# Patient Record
Sex: MALE | Race: WHITE | Employment: UNEMPLOYED | URBAN - NONMETROPOLITAN AREA
[De-identification: names, ages, dates, MRNs, and addresses within clinical notes are randomized per-mention and may not be internally consistent; named-entity substitution may affect disease eponyms.]

---

## 2019-07-22 ENCOUNTER — HOSPITAL ENCOUNTER (EMERGENCY)
Facility: HOSPITAL | Age: 24
Discharge: HOME | End: 2019-07-22
Attending: NURSE PRACTITIONER | Admitting: NURSE PRACTITIONER
Payer: COMMERCIAL

## 2019-07-22 ENCOUNTER — APPOINTMENT (OUTPATIENT)
Dept: ULTRASOUND IMAGING | Facility: HOSPITAL | Age: 24
End: 2019-07-22
Attending: NURSE PRACTITIONER
Payer: COMMERCIAL

## 2019-07-22 ENCOUNTER — APPOINTMENT (OUTPATIENT)
Dept: GENERAL RADIOLOGY | Facility: HOSPITAL | Age: 24
End: 2019-07-22
Attending: NURSE PRACTITIONER
Payer: COMMERCIAL

## 2019-07-22 VITALS
RESPIRATION RATE: 16 BRPM | HEART RATE: 83 BPM | TEMPERATURE: 97.9 F | SYSTOLIC BLOOD PRESSURE: 127 MMHG | OXYGEN SATURATION: 98 % | WEIGHT: 160 LBS | DIASTOLIC BLOOD PRESSURE: 86 MMHG

## 2019-07-22 DIAGNOSIS — K59.00 CONSTIPATION, UNSPECIFIED CONSTIPATION TYPE: ICD-10-CM

## 2019-07-22 LAB
ALBUMIN SERPL-MCNC: 4.5 G/DL (ref 3.4–5)
ALBUMIN UR-MCNC: NEGATIVE MG/DL
ALP SERPL-CCNC: 85 U/L (ref 40–150)
ALT SERPL W P-5'-P-CCNC: 50 U/L (ref 0–70)
ANION GAP SERPL CALCULATED.3IONS-SCNC: 6 MMOL/L (ref 3–14)
APPEARANCE UR: CLEAR
AST SERPL W P-5'-P-CCNC: 21 U/L (ref 0–45)
BASOPHILS # BLD AUTO: 0.1 10E9/L (ref 0–0.2)
BASOPHILS NFR BLD AUTO: 0.4 %
BILIRUB SERPL-MCNC: 1.4 MG/DL (ref 0.2–1.3)
BILIRUB UR QL STRIP: NEGATIVE
BUN SERPL-MCNC: 10 MG/DL (ref 7–30)
CALCIUM SERPL-MCNC: 9.3 MG/DL (ref 8.5–10.1)
CHLORIDE SERPL-SCNC: 99 MMOL/L (ref 94–109)
CO2 SERPL-SCNC: 29 MMOL/L (ref 20–32)
COLOR UR AUTO: ABNORMAL
CREAT SERPL-MCNC: 0.87 MG/DL (ref 0.66–1.25)
CRP SERPL-MCNC: <2.9 MG/L (ref 0–8)
DIFFERENTIAL METHOD BLD: ABNORMAL
EOSINOPHIL # BLD AUTO: 0.2 10E9/L (ref 0–0.7)
EOSINOPHIL NFR BLD AUTO: 1.3 %
ERYTHROCYTE [DISTWIDTH] IN BLOOD BY AUTOMATED COUNT: 12.6 % (ref 10–15)
GFR SERPL CREATININE-BSD FRML MDRD: >90 ML/MIN/{1.73_M2}
GLUCOSE SERPL-MCNC: 80 MG/DL (ref 70–99)
GLUCOSE UR STRIP-MCNC: NEGATIVE MG/DL
HCT VFR BLD AUTO: 42.6 % (ref 40–53)
HGB BLD-MCNC: 15 G/DL (ref 13.3–17.7)
HGB UR QL STRIP: NEGATIVE
IMM GRANULOCYTES # BLD: 0.1 10E9/L (ref 0–0.4)
IMM GRANULOCYTES NFR BLD: 0.4 %
KETONES UR STRIP-MCNC: 5 MG/DL
LEUKOCYTE ESTERASE UR QL STRIP: NEGATIVE
LYMPHOCYTES # BLD AUTO: 3.6 10E9/L (ref 0.8–5.3)
LYMPHOCYTES NFR BLD AUTO: 31.7 %
MCH RBC QN AUTO: 29.9 PG (ref 26.5–33)
MCHC RBC AUTO-ENTMCNC: 35.2 G/DL (ref 31.5–36.5)
MCV RBC AUTO: 85 FL (ref 78–100)
MONOCYTES # BLD AUTO: 0.9 10E9/L (ref 0–1.3)
MONOCYTES NFR BLD AUTO: 7.6 %
NEUTROPHILS # BLD AUTO: 6.6 10E9/L (ref 1.6–8.3)
NEUTROPHILS NFR BLD AUTO: 58.6 %
NITRATE UR QL: NEGATIVE
NRBC # BLD AUTO: 0 10*3/UL
NRBC BLD AUTO-RTO: 0 /100
PH UR STRIP: 5.5 PH (ref 4.7–8)
PLATELET # BLD AUTO: 221 10E9/L (ref 150–450)
POTASSIUM SERPL-SCNC: 3.7 MMOL/L (ref 3.4–5.3)
PROT SERPL-MCNC: 7.9 G/DL (ref 6.8–8.8)
RBC # BLD AUTO: 5.02 10E12/L (ref 4.4–5.9)
SODIUM SERPL-SCNC: 134 MMOL/L (ref 133–144)
SOURCE: ABNORMAL
SP GR UR STRIP: 1 (ref 1–1.03)
UROBILINOGEN UR STRIP-MCNC: NORMAL MG/DL (ref 0–2)
WBC # BLD AUTO: 11.3 10E9/L (ref 4–11)

## 2019-07-22 PROCEDURE — 36415 COLL VENOUS BLD VENIPUNCTURE: CPT | Performed by: NURSE PRACTITIONER

## 2019-07-22 PROCEDURE — G0463 HOSPITAL OUTPT CLINIC VISIT: HCPCS | Mod: 25

## 2019-07-22 PROCEDURE — 86140 C-REACTIVE PROTEIN: CPT | Performed by: NURSE PRACTITIONER

## 2019-07-22 PROCEDURE — 81003 URINALYSIS AUTO W/O SCOPE: CPT | Performed by: NURSE PRACTITIONER

## 2019-07-22 PROCEDURE — 80053 COMPREHEN METABOLIC PANEL: CPT | Performed by: NURSE PRACTITIONER

## 2019-07-22 PROCEDURE — 99203 OFFICE O/P NEW LOW 30 MIN: CPT | Mod: Z6 | Performed by: NURSE PRACTITIONER

## 2019-07-22 PROCEDURE — 74019 RADEX ABDOMEN 2 VIEWS: CPT | Mod: TC

## 2019-07-22 PROCEDURE — 85025 COMPLETE CBC W/AUTO DIFF WBC: CPT | Performed by: NURSE PRACTITIONER

## 2019-07-22 PROCEDURE — 76705 ECHO EXAM OF ABDOMEN: CPT | Mod: TC

## 2019-07-22 RX ORDER — NICOTINE 21 MG/24HR
1 PATCH, TRANSDERMAL 24 HOURS TRANSDERMAL EVERY 24 HOURS
COMMUNITY

## 2019-07-22 RX ORDER — POLYETHYLENE GLYCOL 3350 17 G/17G
1 POWDER, FOR SOLUTION ORAL DAILY
Qty: 527 G | Refills: 0 | Status: SHIPPED | OUTPATIENT
Start: 2019-07-22 | End: 2019-08-21

## 2019-07-22 ASSESSMENT — ENCOUNTER SYMPTOMS
TROUBLE SWALLOWING: 0
COUGH: 0
CONSTIPATION: 1
PSYCHIATRIC NEGATIVE: 1
FREQUENCY: 0
NECK STIFFNESS: 0
ACTIVITY CHANGE: 0
FEVER: 0
WEAKNESS: 0
CHILLS: 0
NECK PAIN: 0

## 2019-07-22 NOTE — ED PROVIDER NOTES
History     Chief Complaint   Patient presents with     Constipation     Insomnia     The history is provided by the patient. No  was used.     Lv Guzman is a 24 year old male who presents with left flank pain, abdominal fullness, constipation, and insomnia. He's taken Nyquil with mild effectiveness. Denies fever or chills. Posiitve for night sweats. Drinking well. Decreased appetite. Bowel and bladder are working well. No antibiotic use in the past 30 days.       Allergies:  No Known Allergies    Problem List:    There are no active problems to display for this patient.       Past Medical History:    History reviewed. No pertinent past medical history.    Past Surgical History:    History reviewed. No pertinent surgical history.    Family History:    History reviewed. No pertinent family history.    Social History:  Marital Status:  Single [1]  Social History     Tobacco Use     Smoking status: Former Smoker     Last attempt to quit: 2018     Years since quittin.0     Smokeless tobacco: Current User   Substance Use Topics     Alcohol use: Yes     Comment: rarely     Drug use: Not Currently        Medications:      nicotine (NICODERM CQ) 14 MG/24HR 24 hr patch   polyethylene glycol (MIRALAX) powder         Review of Systems   Constitutional: Positive for appetite change. Negative for activity change, chills, fatigue and fever.   HENT: Negative for congestion and trouble swallowing.    Respiratory: Negative for cough.    Cardiovascular: Negative for chest pain.   Gastrointestinal: Positive for constipation. Negative for abdominal pain, nausea and vomiting.        Abdominal fullness. Last BM 2 days ago.    Genitourinary: Positive for flank pain. Negative for discharge, dysuria, frequency, penile pain, penile swelling, scrotal swelling and urgency.        Left flank pain.    Musculoskeletal: Negative for neck pain and neck stiffness.   Skin: Negative for rash.   Neurological:  Negative for weakness and numbness.   Psychiatric/Behavioral: Negative.         Insomnia. Anxiety from insomnia.        Physical Exam   BP: 127/86  Pulse: 83  Temp: 97.9  F (36.6  C)  Resp: 16  Weight: 72.6 kg (160 lb)  SpO2: 98 %      Physical Exam   Constitutional: He is oriented to person, place, and time. He appears well-developed and well-nourished. No distress.   HENT:   Head: Normocephalic.   Mouth/Throat: Oropharynx is clear and moist.   Neck: Normal range of motion. Neck supple.   Cardiovascular: Normal rate, regular rhythm and normal heart sounds.   No murmur heard.  Pulmonary/Chest: Effort normal. No stridor. No respiratory distress. He has no wheezes. He has no rales.   Abdominal: Soft. He exhibits no distension and no mass. There is tenderness. There is no rebound and no guarding.   RUQ tenderness. Negative Salinas's sign.    Genitourinary:   Genitourinary Comments: Negative bilateral CVA tenderness.    Musculoskeletal: Normal range of motion.   Neurological: He is alert and oriented to person, place, and time. He exhibits normal muscle tone.   Skin: Skin is warm and dry. Capillary refill takes less than 2 seconds. He is not diaphoretic. No erythema.   Psychiatric: He has a normal mood and affect. His behavior is normal.   Nursing note and vitals reviewed.      ED Course     Procedures    Results for orders placed or performed during the hospital encounter of 07/22/19   XR Abdomen 2 Views    Narrative    XR ABDOMEN 2 VW   7/22/2019 10:03 AM    History:Male,age  24 years, Constipation. Abdominal fullness.    Comparison: None    FINDINGS: Two views are submitted. Moderate volume of stool is seen  within the colon. There is no evidence of free air or evidence of  obstruction.      Impression    IMPRESSION:  Moderate volume of stool, no acute abnormality.    CHELSIE WATKINS MD   Abdomen US, limited (RUQ only)    Narrative    PROCEDURES: US ABDOMEN LIMITED    HISTORY: RUQ pain., Rule out  cholelithiasis.    TECHNIQUE: Grayscale ultrasound of the upper abdomen was performed.    COMPARISON: none     FINDINGS:    MEASUREMENTS:   Liver length:  60 cm.   Common duct diameter:  0.1 cm.    LIVER: The liver is free of masses or biliary ductal enlargement    GALLBLADDER: No gallstones are seen    ABDOMINAL AORTA AND IVC: The abdominal aorta is normally tapering. The  inferior vena cava is patent.    PANCREAS: Portions of the head and body the pancreas were imaged. The  visualized portions appear normal.    Right KIDNEY: Right kidney is free of masses or hydronephrosis        Impression    IMPRESSION:     Normal ultrasound right upper quadrant    NOEMY BUTTS MD   UA reflex to Microscopic and Culture   Result Value Ref Range    Color Urine Light Yellow     Appearance Urine Clear     Glucose Urine Negative NEG^Negative mg/dL    Bilirubin Urine Negative NEG^Negative    Ketones Urine 5 (A) NEG^Negative mg/dL    Specific Gravity Urine 1.005 1.003 - 1.035    Blood Urine Negative NEG^Negative    pH Urine 5.5 4.7 - 8.0 pH    Protein Albumin Urine Negative NEG^Negative mg/dL    Urobilinogen mg/dL Normal 0.0 - 2.0 mg/dL    Nitrite Urine Negative NEG^Negative    Leukocyte Esterase Urine Negative NEG^Negative    Source Midstream Urine    CBC with platelets differential   Result Value Ref Range    WBC 11.3 (H) 4.0 - 11.0 10e9/L    RBC Count 5.02 4.4 - 5.9 10e12/L    Hemoglobin 15.0 13.3 - 17.7 g/dL    Hematocrit 42.6 40.0 - 53.0 %    MCV 85 78 - 100 fl    MCH 29.9 26.5 - 33.0 pg    MCHC 35.2 31.5 - 36.5 g/dL    RDW 12.6 10.0 - 15.0 %    Platelet Count 221 150 - 450 10e9/L    Diff Method Automated Method     % Neutrophils 58.6 %    % Lymphocytes 31.7 %    % Monocytes 7.6 %    % Eosinophils 1.3 %    % Basophils 0.4 %    % Immature Granulocytes 0.4 %    Nucleated RBCs 0 0 /100    Absolute Neutrophil 6.6 1.6 - 8.3 10e9/L    Absolute Lymphocytes 3.6 0.8 - 5.3 10e9/L    Absolute Monocytes 0.9 0.0 - 1.3 10e9/L    Absolute  Eosinophils 0.2 0.0 - 0.7 10e9/L    Absolute Basophils 0.1 0.0 - 0.2 10e9/L    Abs Immature Granulocytes 0.1 0 - 0.4 10e9/L    Absolute Nucleated RBC 0.0    CRP inflammation   Result Value Ref Range    CRP Inflammation <2.9 0.0 - 8.0 mg/L   Comprehensive metabolic panel   Result Value Ref Range    Sodium 134 133 - 144 mmol/L    Potassium 3.7 3.4 - 5.3 mmol/L    Chloride 99 94 - 109 mmol/L    Carbon Dioxide 29 20 - 32 mmol/L    Anion Gap 6 3 - 14 mmol/L    Glucose 80 70 - 99 mg/dL    Urea Nitrogen 10 7 - 30 mg/dL    Creatinine 0.87 0.66 - 1.25 mg/dL    GFR Estimate >90 >60 mL/min/[1.73_m2]    GFR Estimate If Black >90 >60 mL/min/[1.73_m2]    Calcium 9.3 8.5 - 10.1 mg/dL    Bilirubin Total 1.4 (H) 0.2 - 1.3 mg/dL    Albumin 4.5 3.4 - 5.0 g/dL    Protein Total 7.9 6.8 - 8.8 g/dL    Alkaline Phosphatase 85 40 - 150 U/L    ALT 50 0 - 70 U/L    AST 21 0 - 45 U/L       Assessments & Plan (with Medical Decision Making)     Work up essentially benign. Constipation on XRAY. He feels his insomnia is related to his abdominal fullness. I started him on Miralax daily. He will follow up with any increase in symptoms or concerns.    Discussed plan of care. He verbalized understanding. All questions answered.     I have reviewed the nursing notes.    I have reviewed the findings, diagnosis, plan and need for follow up with the patient.  Discharged in stable condition.        Medication List      Started    polyethylene glycol powder  Commonly known as:  MIRALAX  1 capful, Oral, DAILY            Final diagnoses:   Constipation, unspecified constipation type     Use Miralax daily.   Increase fiber in diet. Handout given.   Increase water intake.   Follow up with PCP with any increase in symptoms or concerns.   Return to urgent care or emergency department with any increase in symptoms or concerns.     Lilly BARR FNP  7/22/2019  9:07 AM  [facility]       Lilly Baer NP  07/23/19 [id number]       Buffy  ANGELIKA Carr  07/23/19 2141       Lilly Baer, ANGELIKA  07/23/19 2144

## 2019-07-22 NOTE — ED NOTES
Pt presents with c/o constipation and some abdominal fullness but denies pain. Pt also c/o insomnia and states he doesn't feel like he has been voiding as often as he should, denies hx of kidney disease, denies dysuria and hesitancy. Pt declines to be seen in the ED and is requesting to be seen in the urgent care. Pt advised that he will have to wait 2 hours before Urgent Care opens and that if he develops any abdominal pain he will have to be seen in the ED. Pt states he still wants to be seen in the Urgent Care and will notify staff if he develops any abdominal pain or if anything changes.

## 2019-07-22 NOTE — DISCHARGE INSTRUCTIONS
Use Miralax daily.   Increase fiber in diet. Handout given.   Increase water intake.   Follow up with PCP with any increase in symptoms or concerns.   Return to urgent care or emergency department with any increase in symptoms or concerns.

## 2019-07-22 NOTE — ED AVS SNAPSHOT
[facility]  [address]  [place] [address]  Phone:  [phone]                                    Lv Guzman   MRN: [id number]    Department:  [facility]   Date of Visit:  7/22/2019           After Visit Summary Signature Page    I have received my discharge instructions, and my questions have been answered. I have discussed any challenges I see with this plan with the nurse or doctor.    ..........................................................................................................................................  Patient/Patient Representative Signature      ..........................................................................................................................................  Patient Representative Print Name and Relationship to Patient    ..................................................               ................................................  Date                                   Time    ..........................................................................................................................................  Reviewed by Signature/Title    ...................................................              ..............................................  Date                                               Time          22EPIC Rev 08/18

## 2019-07-23 ASSESSMENT — ENCOUNTER SYMPTOMS
ABDOMINAL PAIN: 0
NAUSEA: 0
APPETITE CHANGE: 1
FATIGUE: 0
VOMITING: 0
DYSURIA: 0
FLANK PAIN: 1
NUMBNESS: 0

## 2020-08-09 ENCOUNTER — OFFICE VISIT (OUTPATIENT)
Dept: URGENT CARE | Facility: URGENT CARE | Age: 25
End: 2020-08-09
Payer: COMMERCIAL

## 2020-08-09 VITALS
SYSTOLIC BLOOD PRESSURE: 102 MMHG | TEMPERATURE: 97.2 F | WEIGHT: 160 LBS | OXYGEN SATURATION: 100 % | HEART RATE: 93 BPM | DIASTOLIC BLOOD PRESSURE: 63 MMHG

## 2020-08-09 DIAGNOSIS — M54.9 UPPER BACK PAIN ON RIGHT SIDE: Primary | ICD-10-CM

## 2020-08-09 LAB
ALBUMIN UR-MCNC: NEGATIVE MG/DL
APPEARANCE UR: CLEAR
BILIRUB UR QL STRIP: NEGATIVE
COLOR UR AUTO: YELLOW
GLUCOSE UR STRIP-MCNC: NEGATIVE MG/DL
HGB UR QL STRIP: NEGATIVE
KETONES UR STRIP-MCNC: 15 MG/DL
LEUKOCYTE ESTERASE UR QL STRIP: NEGATIVE
NITRATE UR QL: NEGATIVE
PH UR STRIP: 6.5 PH (ref 5–7)
SOURCE: ABNORMAL
SP GR UR STRIP: 1.01 (ref 1–1.03)
UROBILINOGEN UR STRIP-ACNC: 0.2 EU/DL (ref 0.2–1)

## 2020-08-09 PROCEDURE — 99203 OFFICE O/P NEW LOW 30 MIN: CPT | Performed by: FAMILY MEDICINE

## 2020-08-09 PROCEDURE — 81003 URINALYSIS AUTO W/O SCOPE: CPT | Performed by: FAMILY MEDICINE

## 2020-08-09 NOTE — PATIENT INSTRUCTIONS
Patient Education     How Your Back Works  A healthy back lets you bend and stretch without pain. The spine has 3 natural curves. These keep your body balanced. Strong, flexible muscles support your spine. Soft, cushioning disks separate the hard bones of your spine. The disks let your spine bend and move.    The parts of the spine    The vertebrae are the 24 bones that make up the spine.    The spinous process is the part of each vertebra you can feel through your skin.    Each of these bones has a central hole that runs top to bottom. Together these holes form a tunnel called the spinal canal.    The lamina of each vertebra forms the back of the spinal canal.    Running through the canal are nerves. They are attached in a bundle called the spinal cord for most of the canal.    A foramen is a small opening where a spinal nerve root leaves the spinal canal.    Disks serve as cushions between vertebrae. A disk s soft center absorbs shock during movement.     Two vertebrae and a disk     The supporting muscles  Strong, flexible muscles help maintain your 3 natural curves. They hold your spine in correct alignment. This helps support your upper body. Strong core muscles help take the strain off your back. These include the stomach, buttock, and thigh muscles.  Date Last Reviewed: 1/1/2018 2000-2019 [employer]. [address] [place] [address], [place] [address]. All rights reserved. This information is not intended as a substitute for professional medical care. Always follow your healthcare professional's instructions.

## 2020-08-09 NOTE — PROGRESS NOTES
Subjective     Lv Guzman is a 25 year old male who presents to clinic today for the following health issues:    HPI   Patient comes in today with concern of right upper back pain, feeling more achy for the past day.  He denies any injury, denies any trauma.  He reports he has been physically more active, exercising.  He denies other pain, has no nausea, no vomiting, denies abdominal pain.    He does have a plan movement daily, continue making good amount of urine.  Does not drink a lot of water.  Denies blood in his urine or stool.  He has no fever, no chills, no weight loss.  He reports pain is worse when he moves certain ways.  Does not change when he eat, drink, no pain when he take a deep breath or cough.  Denies recent sickness.  There is no problem list on file for this patient.    No past surgical history on file.    Social History     Tobacco Use     Smoking status: Former Smoker     Last attempt to quit: 2018     Years since quittin.0     Smokeless tobacco: Current User   Substance Use Topics     Alcohol use: Yes     Comment: rarely     No family history on file.      Current Outpatient Medications   Medication Sig Dispense Refill     nicotine (NICODERM CQ) 14 MG/24HR 24 hr patch Place 1 patch onto the skin every 24 hours       No Known Allergies    Reviewed and updated as needed this visit by Provider         Review of Systems   Constitutional, HEENT, cardiovascular, pulmonary, gi and gu systems are negative, except as otherwise noted.      Objective    /63   Pulse 93   Temp 97.2  F (36.2  C) (Tympanic)   Wt 72.6 kg (160 lb)   SpO2 100%   There is no height or weight on file to calculate BMI.  Physical Exam   GENERAL APPEARANCE: healthy, alert and no distress  NECK: no adenopathy, no asymmetry, masses, or scars and thyroid normal to palpation  RESP: lungs clear to auscultation - no rales, rhonchi or wheezes  CV: regular rates and rhythm, normal S1 S2, no S3 or S4 and no  murmur, click or rub  ABDOMEN: soft, nontender, without hepatosplenomegaly or masses and bowel sounds normal  ORTHO: Lumber/Thoracic Spine Exam: Tender:  right parathoracic muscles  Non-tender:  Rest of his back exam,  Range of Motion:  left lateral thoracic bending   full, painful, right lateral thoracic bending  full, painful, left thoracic rotation  full, right thoracic rotation  full, painful, lumbar flexion  full, pain-free, lumbar extension  full, pain-free  Strength:  able to heel walk, able to toe walk  Special tests:  negative straight leg raises    Hip Exam: Hip ROM full    SKIN: no suspicious lesions or rashes  NEURO: Normal strength and tone, mentation intact and speech normal  PSYCH: mentation appears normal and affect normal/bright    Diagnostic Test Results:  Labs reviewed in Epic  UA RESULTS:  Recent Labs   Lab Test 08/09/20  0915   COLOR Yellow   APPEARANCE Clear   URINEGLC Negative   URINEBILI Negative   URINEKETONE 15*   SG 1.010   UBLD Negative   URINEPH 6.5   PROTEIN Negative   UROBILINOGEN 0.2   NITRITE Negative   LEUKEST Negative             Assessment & Plan       ICD-10-CM    1. Upper back pain on right side  M54.9      Reviewed his urine results, showed only ketones.  Could be related to dehydration, or physical activity.  Otherwise, patient exam was normal.  Likely related to musculoskeletal in nature.  Was advised with supportive care, home subsides.    Patient Instructions       Patient Education     How Your Back Works  A healthy back lets you bend and stretch without pain. The spine has 3 natural curves. These keep your body balanced. Strong, flexible muscles support your spine. Soft, cushioning disks separate the hard bones of your spine. The disks let your spine bend and move.    The parts of the spine    The vertebrae are the 24 bones that make up the spine.    The spinous process is the part of each vertebra you can feel through your skin.    Each of these bones has a central hole  that runs top to bottom. Together these holes form a tunnel called the spinal canal.    The lamina of each vertebra forms the back of the spinal canal.    Running through the canal are nerves. They are attached in a bundle called the spinal cord for most of the canal.    A foramen is a small opening where a spinal nerve root leaves the spinal canal.    Disks serve as cushions between vertebrae. A disk s soft center absorbs shock during movement.     Two vertebrae and a disk     The supporting muscles  Strong, flexible muscles help maintain your 3 natural curves. They hold your spine in correct alignment. This helps support your upper body. Strong core muscles help take the strain off your back. These include the stomach, buttock, and thigh muscles.  Date Last Reviewed: 1/1/2018 2000-2019 [employer]. [address] [place] [address], [place] [address]. All rights reserved. This information is not intended as a substitute for professional medical care. Always follow your healthcare professional's instructions.               Return in about 1 week (around 8/16/2020) for In-Clinic Visit.    Ld Rodriguez MD  [facility]

## 2020-08-10 ENCOUNTER — OFFICE VISIT (OUTPATIENT)
Dept: URGENT CARE | Facility: URGENT CARE | Age: 25
End: 2020-08-10
Payer: COMMERCIAL

## 2020-08-10 ENCOUNTER — ANCILLARY PROCEDURE (OUTPATIENT)
Dept: GENERAL RADIOLOGY | Facility: CLINIC | Age: 25
End: 2020-08-10
Attending: PHYSICIAN ASSISTANT
Payer: COMMERCIAL

## 2020-08-10 VITALS
HEART RATE: 64 BPM | TEMPERATURE: 98.4 F | DIASTOLIC BLOOD PRESSURE: 76 MMHG | WEIGHT: 162 LBS | RESPIRATION RATE: 16 BRPM | SYSTOLIC BLOOD PRESSURE: 106 MMHG | OXYGEN SATURATION: 100 %

## 2020-08-10 DIAGNOSIS — K59.00 CONSTIPATION, UNSPECIFIED CONSTIPATION TYPE: ICD-10-CM

## 2020-08-10 DIAGNOSIS — R10.11 RUQ ABDOMINAL PAIN: ICD-10-CM

## 2020-08-10 DIAGNOSIS — R10.11 RUQ ABDOMINAL PAIN: Primary | ICD-10-CM

## 2020-08-10 LAB
BASOPHILS # BLD AUTO: 0 10E9/L (ref 0–0.2)
BASOPHILS NFR BLD AUTO: 0.3 %
DIFFERENTIAL METHOD BLD: NORMAL
EOSINOPHIL # BLD AUTO: 0.2 10E9/L (ref 0–0.7)
EOSINOPHIL NFR BLD AUTO: 2 %
ERYTHROCYTE [DISTWIDTH] IN BLOOD BY AUTOMATED COUNT: 12.7 % (ref 10–15)
HCT VFR BLD AUTO: 45.6 % (ref 40–53)
HGB BLD-MCNC: 15.7 G/DL (ref 13.3–17.7)
LYMPHOCYTES # BLD AUTO: 2.9 10E9/L (ref 0.8–5.3)
LYMPHOCYTES NFR BLD AUTO: 26.9 %
MCH RBC QN AUTO: 28.9 PG (ref 26.5–33)
MCHC RBC AUTO-ENTMCNC: 34.4 G/DL (ref 31.5–36.5)
MCV RBC AUTO: 84 FL (ref 78–100)
MONOCYTES # BLD AUTO: 1.1 10E9/L (ref 0–1.3)
MONOCYTES NFR BLD AUTO: 10.1 %
NEUTROPHILS # BLD AUTO: 6.5 10E9/L (ref 1.6–8.3)
NEUTROPHILS NFR BLD AUTO: 60.7 %
PLATELET # BLD AUTO: 220 10E9/L (ref 150–450)
RBC # BLD AUTO: 5.43 10E12/L (ref 4.4–5.9)
WBC # BLD AUTO: 10.7 10E9/L (ref 4–11)

## 2020-08-10 PROCEDURE — 36415 COLL VENOUS BLD VENIPUNCTURE: CPT | Performed by: PHYSICIAN ASSISTANT

## 2020-08-10 PROCEDURE — 85025 COMPLETE CBC W/AUTO DIFF WBC: CPT | Performed by: PHYSICIAN ASSISTANT

## 2020-08-10 PROCEDURE — 99214 OFFICE O/P EST MOD 30 MIN: CPT | Performed by: PHYSICIAN ASSISTANT

## 2020-08-10 PROCEDURE — 74019 RADEX ABDOMEN 2 VIEWS: CPT

## 2020-08-10 ASSESSMENT — ENCOUNTER SYMPTOMS
FATIGUE: 0
COUGH: 0
RHINORRHEA: 0
PALPITATIONS: 0
SORE THROAT: 0
CONSTIPATION: 1
FEVER: 0
NAUSEA: 0
ABDOMINAL PAIN: 1
WHEEZING: 0
ARTHRALGIAS: 0
SHORTNESS OF BREATH: 0
CHILLS: 0
EYE ITCHING: 0
EYE DISCHARGE: 0
UNEXPECTED WEIGHT CHANGE: 0
SINUS PAIN: 0
VOMITING: 0
EYE REDNESS: 0
DIARRHEA: 0
MYALGIAS: 0
EYE PAIN: 0
SINUS PRESSURE: 0

## 2020-08-10 NOTE — PATIENT INSTRUCTIONS
Start miralax once daily  If pain is severe go to the ER  If pain continues follow up with primary care provider

## 2020-08-10 NOTE — PROGRESS NOTES
SUBJECTIVE:   Lv Guzman is a 25 year old male presenting with a chief complaint of   Chief Complaint   Patient presents with     Abdominal Pain     x3 days, right abdominal pain and right back pain, feels like there is alot of pressure, said he is having abnormal bowels, said he is constipated, was seen in urgent care yesterday        He is an established patient of [facility].    Abdominal Pain    Location: RUQ   Radiation: None and back.    Pain character: dull and aching,   Severity: 2 on a scale of 1-10.    Duration: 3 day(s)   Course of Illness: slowly progressive.  Exacerbated by: nothing  Relieved by: nothing.  Associated Symptoms: constipation, nausea, last BM was yesterday was diarrhea, had milk of mag  Female : Not applicable  Surgical History: none        Review of Systems   Constitutional: Negative for chills, fatigue, fever and unexpected weight change.   HENT: Negative for congestion, ear pain, rhinorrhea, sinus pressure, sinus pain and sore throat.    Eyes: Negative for pain, discharge, redness and itching.   Respiratory: Negative for cough, shortness of breath and wheezing.    Cardiovascular: Negative for chest pain, palpitations and leg swelling.   Gastrointestinal: Positive for abdominal pain and constipation. Negative for diarrhea, nausea and vomiting.   Musculoskeletal: Negative for arthralgias and myalgias.   Skin: Negative for rash.       History reviewed. No pertinent past medical history.  History reviewed. No pertinent family history.  Current Outpatient Medications   Medication Sig Dispense Refill     nicotine (NICODERM CQ) 14 MG/24HR 24 hr patch Place 1 patch onto the skin every 24 hours       Social History     Tobacco Use     Smoking status: Former Smoker     Last attempt to quit: 2018     Years since quittin.0     Smokeless tobacco: Current User   Substance Use Topics     Alcohol use: Yes     Comment: rarely       OBJECTIVE  /76   Pulse 64   Temp 98.4  F  (36.9  C) (Tympanic)   Resp 16   Wt 73.5 kg (162 lb)   SpO2 100%     Physical Exam  Constitutional:       General: He is not in acute distress.     Appearance: He is well-developed.   HENT:      Head: Normocephalic and atraumatic.      Right Ear: Tympanic membrane and ear canal normal.      Left Ear: Tympanic membrane and ear canal normal.   Eyes:      Conjunctiva/sclera: Conjunctivae normal.      Pupils: Pupils are equal, round, and reactive to light.   Cardiovascular:      Rate and Rhythm: Normal rate and regular rhythm.   Pulmonary:      Effort: Pulmonary effort is normal.      Breath sounds: Normal breath sounds.   Abdominal:      General: Bowel sounds are normal.      Palpations: Abdomen is soft.      Tenderness: There is abdominal tenderness in the right upper quadrant. There is no guarding or rebound.   Skin:     General: Skin is warm and dry.      Findings: No rash.   Psychiatric:         Behavior: Behavior normal.         Labs:  Results for orders placed or performed in visit on 08/10/20 (from the past 24 hour(s))   CBC with platelets differential   Result Value Ref Range    WBC 10.7 4.0 - 11.0 10e9/L    RBC Count 5.43 4.4 - 5.9 10e12/L    Hemoglobin 15.7 13.3 - 17.7 g/dL    Hematocrit 45.6 40.0 - 53.0 %    MCV 84 78 - 100 fl    MCH 28.9 26.5 - 33.0 pg    MCHC 34.4 31.5 - 36.5 g/dL    RDW 12.7 10.0 - 15.0 %    Platelet Count 220 150 - 450 10e9/L    % Neutrophils 60.7 %    % Lymphocytes 26.9 %    % Monocytes 10.1 %    % Eosinophils 2.0 %    % Basophils 0.3 %    Absolute Neutrophil 6.5 1.6 - 8.3 10e9/L    Absolute Lymphocytes 2.9 0.8 - 5.3 10e9/L    Absolute Monocytes 1.1 0.0 - 1.3 10e9/L    Absolute Eosinophils 0.2 0.0 - 0.7 10e9/L    Absolute Basophils 0.0 0.0 - 0.2 10e9/L    Diff Method Automated Method        X-Ray was done, my findings are: no SBO    ASSESSMENT:      ICD-10-CM    1. RUQ abdominal pain  R10.11 CBC with platelets differential     XR Abdomen 2 Views   2. Constipation, unspecified  constipation type  K59.00         Medical Decision Making:    Differential Diagnosis:  Abdominal Pain: Constipation, GB/Cholelithiasis, GERD/Ulcer, Appendix, IBS, Abdominal Wall and Bowel Obstruction    Serious Comorbid Conditions:  Adult:  None    PLAN:    ABD Pain:  Continue to monitor symptoms. Start miralax for constipation. Discussed in detail symptoms that would warrant emergent evaluation in the ED. Patient agrees with plan and will follow up as needed.      Followup:    If not improving or if condition worsens, follow up with your Primary Care Provider    Patient Instructions   Start miralax once daily  If pain is severe go to the ER  If pain continues follow up with primary care provider

## 2021-02-15 ENCOUNTER — OFFICE VISIT (OUTPATIENT)
Dept: FAMILY MEDICINE | Facility: CLINIC | Age: 26
End: 2021-02-15
Payer: COMMERCIAL

## 2021-02-15 VITALS
HEART RATE: 70 BPM | BODY MASS INDEX: 22.48 KG/M2 | DIASTOLIC BLOOD PRESSURE: 64 MMHG | WEIGHT: 157 LBS | OXYGEN SATURATION: 98 % | RESPIRATION RATE: 16 BRPM | SYSTOLIC BLOOD PRESSURE: 90 MMHG | TEMPERATURE: 98.1 F | HEIGHT: 70 IN

## 2021-02-15 DIAGNOSIS — R42 DIZZINESS: Primary | ICD-10-CM

## 2021-02-15 PROBLEM — Q23.81 BICUSPID AORTIC VALVE: Status: ACTIVE | Noted: 2019-04-11

## 2021-02-15 PROCEDURE — 36415 COLL VENOUS BLD VENIPUNCTURE: CPT | Performed by: NURSE PRACTITIONER

## 2021-02-15 PROCEDURE — 99213 OFFICE O/P EST LOW 20 MIN: CPT | Performed by: NURSE PRACTITIONER

## 2021-02-15 PROCEDURE — 82947 ASSAY GLUCOSE BLOOD QUANT: CPT | Performed by: NURSE PRACTITIONER

## 2021-02-15 RX ORDER — MECLIZINE HYDROCHLORIDE 25 MG/1
25 TABLET ORAL 3 TIMES DAILY PRN
Qty: 20 TABLET | Refills: 0 | Status: SHIPPED | OUTPATIENT
Start: 2021-02-15 | End: 2021-06-08

## 2021-02-15 ASSESSMENT — MIFFLIN-ST. JEOR: SCORE: 1707.37

## 2021-02-15 NOTE — PROGRESS NOTES
"    {PROVIDER CHARTING PREFERENCE:913453}    Subjective   Lv is a 25 year old who presents for the following health issues {ACCOMPANIED BY STATEMENT (Optional):806013}    HPI       Abdominal/Flank Pain  Onset/Duration: ***  Description:   Character: {.:621663}  Location: {.:234714}  Radiation: {.:597620::\"None\"}  Intensity: {.:203999}  Progression of Symptoms:  {.:093991}  Accompanying Signs & Symptoms:  Fever/Chills: {.:334662::\"no\"}  Gas/Bloating: {.:683255::\"no\"}  Nausea: {.:405487::\"no\"}  Vomitting: {.:107886::\"no\"}  Diarrhea: {.:060918::\"no\"}  Constipation: {.:470281::\"no\"}  Dysuria or Hematuria: {.:813779::\"no\"}  History:   Trauma: {.:044019::\"no\"}  Previous similar pain: {.:708555::\"no\"}  Previous tests done: { .:369750}  Precipitating factors:   Does the pain change with:     Food: {.:364977::\"no\"}    Bowel Movement: {.:011408::\"no\"}    Urination: {.:770069::\"no\"}   Other factors:  {.:887611::\"no\"}  Therapies tried and outcome: {NONEORCHOOSE:100544::\"None\"}      {additonal problems for provider to add (Optional):134099}    Review of Systems   {ROS COMP (Optional):666549}      Objective    There were no vitals taken for this visit.  There is no height or weight on file to calculate BMI.  Physical Exam   {Exam List (Optional):196069}    {Diagnostic Test Results (Optional):525961}    {AMBULATORY ATTESTATION (Optional):134959}        "

## 2021-02-15 NOTE — PROGRESS NOTES
"    Assessment & Plan     Dizziness  Benign exam and no focal neuro concerns.  At end of appt he mentions eating a diet high in sugar; will check a glucose today.  Risks, benefits and side effects discussed.  Ensure plenty of fluids.  If symptoms persist over the next 4 wks should be seen for follow-up; sooner if worsening.   - Glucose  - meclizine (ANTIVERT) 25 MG tablet; Take 1 tablet (25 mg) by mouth 3 times daily as needed for dizziness             Return in about 4 weeks (around 3/15/2021) for dizziness .    MOMO Rowan CNP  [facility] REID Awan is a 25 year old who presents for the following health issues     HPI       Dizziness  Onset/Duration: x 1-2 weeks  Description:   Do you feel faint: \"foggy, difficult to focus\"  Does it feel like the surroundings (bed, room) are moving: no  Unsteady/off balance: YES- unsteady  Have you passed out or fallen: no  Intensity: mild, moderate  Progression of Symptoms: worsening  Accompanying Signs & Symptoms:  Heart palpitations or chest pain: no  Nausea, vomiting: YES- nausea  Weakness or lack of coordination in arms or legs: no  Vision or speech changes: no  Numbness or tingling: no  Ringing in ears (Tinnitus): YES  Hearing Loss: no  History:   Head trauma/concussion history: no  Previous similar symptoms: no  Recent bleeding history: no  Any new medications (BP?): no  Precipitating factors:   Worse with activity: no  Worse with head movement: YES  Alleviating factors:   Does staying in a fixed position give relief: no   Therapies tried and outcome: None    Intermittent dizziness that triggers nausea.   Intermittent burning on the R side of the abdomen.    No change in stool pattern or blood in stools.   No vomiting.   No recent illness.  Appetite unchanged, eating well.    Occasional burping and bloating.       Review of Systems   Constitutional, HEENT, cardiovascular, pulmonary, gi and gu, and neuro systems are " "negative, except as otherwise noted.      Objective    BP 90/64 (BP Location: Right arm, Patient Position: Sitting, Cuff Size: Adult Large)   Pulse 70   Temp 98.1  F (36.7  C) (Oral)   Resp 16   Ht 1.784 m (5' 10.25\")   Wt 71.2 kg (157 lb)   SpO2 98%   BMI 22.37 kg/m    Body mass index is 22.37 kg/m .  Physical Exam   GENERAL: healthy, alert and no distress  EYES: Eyes grossly normal to inspection, EOMI, PERRL and conjunctivae and sclerae normal  HENT: ear canals and TM's normal, nose and mouth without ulcers or lesions  NECK: no adenopathy, no asymmetry, masses, or scars and thyroid normal to palpation  RESP: lungs clear to auscultation - no rales, rhonchi or wheezes  CV: regular rate and rhythm, normal S1 S2, no S3 or S4, no murmur, click or rub, no peripheral edema and peripheral pulses strong  ABDOMEN: soft, nontender, no hepatosplenomegaly, no masses and bowel sounds normal  MS: no gross musculoskeletal defects noted, no edema  SKIN: no suspicious lesions or rashes  NEURO: Normal strength and tone, mentation intact, speech normal, cranial nerves 2-12 intact and rapid alternating movements normal  PSYCH: mentation appears normal, affect normal/bright    No results found for this or any previous visit (from the past 24 hour(s)).            "

## 2021-02-16 LAB — GLUCOSE SERPL-MCNC: 81 MG/DL (ref 70–99)

## 2021-03-07 ENCOUNTER — HEALTH MAINTENANCE LETTER (OUTPATIENT)
Age: 26
End: 2021-03-07

## 2021-06-07 ENCOUNTER — NURSE TRIAGE (OUTPATIENT)
Dept: NURSING | Facility: CLINIC | Age: 26
End: 2021-06-07

## 2021-06-07 NOTE — TELEPHONE ENCOUNTER
Patient calls with concerns regarding rectal bleeding. Patient has noticed this for the past couple of weeks. Patient reports that there is no blood in or on the stool, but there will be blood dripping after the stool passes. Patient denies seeing any clots, the water in the toilet does turn red. Patient reports that this is every time he passes a stool recently, stool is soft and easily passed. He denies any dizziness, lightheadedness, or abdominal pain.     Patient is advised on evaluation today per protocol. Patient is agreeable and is transferred to scheduling at this time. He denies further questions or concerns.    Oksana Bennett RN  [facility]      Reason for Disposition    Blood in or on bowel movement is the main symptom    MODERATE rectal bleeding (small blood clots, passing blood without stool, or toilet water turns red)    Additional Information    Negative: Passed out (i.e., fainted, collapsed and was not responding)    Negative: Shock suspected (e.g., cold/pale/clammy skin, too weak to stand, low BP, rapid pulse)    Negative: Vomiting red blood or black (coffee ground) material    Negative: Sounds like a life-threatening emergency to the triager    Negative: Diarrhea is the main symptom    Negative: Rectal symptoms    Negative: SEVERE rectal bleeding (large blood clots; on and off, or constant bleeding)    Negative: SEVERE dizziness (e.g., unable to stand, requires support to walk, feels like passing out now)    Negative: MODERATE rectal bleeding (small blood clots, passing blood without stool, or toilet water turns red) more than once a day    Negative: Bloody, black, or tarry bowel movements (Exception: chronic-unchanged  black-grey bowel movements and is taking iron pills or Pepto-bismol)    Negative: High-risk adult (e.g., prior surgery on aorta, abdominal aortic aneurysm)    Negative: Rectal foreign body (inserted or swallowed)    Negative: SEVERE abdominal pain (e.g.,  excruciating)    Negative: Constant abdominal pain lasting > 2 hours    Negative: Pale skin (pallor) of new onset or worsening    Negative: Patient sounds very sick or weak to the triager    Protocols used: RECTAL SYMPTOMS-A-OH, RECTAL BLEEDING-A-OH  COVID 19 Nurse Triage Plan/Patient Instructions    Please be aware that novel coronavirus (COVID-19) may be circulating in the community. If you develop symptoms such as fever, cough, or SOB or if you have concerns about the presence of another infection including coronavirus (COVID-19), please contact your health care provider or visit [email or web address].     Disposition/Instructions    In-Person Visit with provider recommended. Reference Visit Selection Guide.    Thank you for taking steps to prevent the spread of this virus.  o Limit your contact with others.  o Wear a simple mask to cover your cough.  o Wash your hands well and often.    Resources    [facility]: About COVID-19: [email or web address]    CDC: What to Do If You're Sick: www.cdc.gov/coronavirus/2019-ncov/about/steps-when-sick.html    CDC: Ending Home Isolation: www.cdc.gov/coronavirus/2019-ncov/hcp/disposition-in-home-patients.html     CDC: Caring for Someone: www.cdc.gov/coronavirus/2019-ncov/if-you-are-sick/care-for-someone.html     [facility]: Interim Guidance for Hospital Discharge to Home: www.health.[email or web address].[place]/diseases/coronavirus/hcp/hospdischarge.pdf    [facility] clinical trials (COVID-19 research studies): [email or web address]     Below are the COVID-19 hotlines at the [place] Department of Health ([facility]). Interpreters are available.   o For health questions: Call 135-716-1278 or 1-244.990.8062 (7 a.m. to 7 p.m.)  o For questions about schools and childcare: Call 435-672-8489 or 1-300.591.1653 (7 a.m. to 7 p.m.)

## 2021-06-08 ENCOUNTER — OFFICE VISIT (OUTPATIENT)
Dept: FAMILY MEDICINE | Facility: CLINIC | Age: 26
End: 2021-06-08
Payer: COMMERCIAL

## 2021-06-08 VITALS
TEMPERATURE: 97.6 F | HEIGHT: 70 IN | SYSTOLIC BLOOD PRESSURE: 110 MMHG | DIASTOLIC BLOOD PRESSURE: 78 MMHG | BODY MASS INDEX: 24.22 KG/M2 | OXYGEN SATURATION: 97 % | WEIGHT: 169.2 LBS | RESPIRATION RATE: 16 BRPM | HEART RATE: 78 BPM

## 2021-06-08 DIAGNOSIS — K62.5 BRBPR (BRIGHT RED BLOOD PER RECTUM): Primary | ICD-10-CM

## 2021-06-08 PROCEDURE — 99213 OFFICE O/P EST LOW 20 MIN: CPT | Performed by: PHYSICIAN ASSISTANT

## 2021-06-08 ASSESSMENT — MIFFLIN-ST. JEOR: SCORE: 1758.74

## 2021-06-08 NOTE — PATIENT INSTRUCTIONS
1. Start daily fiber (metamucil)  2. Soak in the tub 2 times daily  3. Use hydrocortisone cream in the tender areas  4. Dont strain on the toilet and dont sit for awhile

## 2021-06-08 NOTE — PROGRESS NOTES
"    Assessment & Plan     BRBPR (bright red blood per rectum)  Reassurance. Looks like a fissure. Discussed OTC and home hygiene. Follow-up if not improving.       Return in about 4 weeks (around 7/6/2021) for recheck if symptoms are not improving..    ROWENA Castaneda [facility] REID Awan is a 25 year old who presents for the following health issues     HPI     Rectal Bleeding  Onset/Duration: 2 weeks  Description:   Denise-anal lump: no  Pain: no  Itching: no  Accompanying Signs & Symptoms: Blood in the toilet  Blood in stool: no  Changes in stool pattern: no  History:   Any previous GI studies done:none  Family History of colon cancer: YES- Paternal Great Grandfather  Precipitating factors:   None  Alleviating factors:  None  Therapies tried and outcome: none      Lv Guzman is a 25 year old male who presents today for new onset blood with BMs  More specifically the toilet bowl will be completely red  There is mild pain with BMs but no constant pain  Possibly one time felt like a tearing  He is not seeing anything in the underwear  Historically he moved the bowels once daily  Usually relatively soft; wouldn't describe it as hard  Otherwise he feels well  No anal sex or experimentation      Review of Systems   Constitutional, HEENT, cardiovascular, pulmonary, gi and gu systems are negative, except as otherwise noted.      Objective    /78 (BP Location: Right arm, Patient Position: Chair, Cuff Size: Adult Regular)   Pulse 78   Temp 97.6  F (36.4  C) (Tympanic)   Resp 16   Ht 1.778 m (5' 10\")   Wt 76.7 kg (169 lb 3.2 oz)   SpO2 97%   BMI 24.28 kg/m    Body mass index is 24.28 kg/m .  Physical Exam   GENERAL: healthy, alert and no distress  RESP: lungs clear to auscultation - no rales, rhonchi or wheezes  CV: regular rate and rhythm, normal S1 S2, no S3 or S4, no murmur, click or rub, no peripheral edema and peripheral pulses strong  ABDOMEN: " soft, nontender, no hepatosplenomegaly, no masses and bowel sounds normal  RECTAL (male): normal sphincter tone, no rectal masses; one small fissure at 11oclock

## 2021-10-10 ENCOUNTER — HEALTH MAINTENANCE LETTER (OUTPATIENT)
Age: 26
End: 2021-10-10

## 2022-03-26 ENCOUNTER — HEALTH MAINTENANCE LETTER (OUTPATIENT)
Age: 27
End: 2022-03-26

## 2022-09-18 ENCOUNTER — HEALTH MAINTENANCE LETTER (OUTPATIENT)
Age: 27
End: 2022-09-18

## 2023-05-07 ENCOUNTER — HEALTH MAINTENANCE LETTER (OUTPATIENT)
Age: 28
End: 2023-05-07